# Patient Record
Sex: FEMALE | Race: ASIAN | NOT HISPANIC OR LATINO | Employment: FULL TIME | URBAN - METROPOLITAN AREA
[De-identification: names, ages, dates, MRNs, and addresses within clinical notes are randomized per-mention and may not be internally consistent; named-entity substitution may affect disease eponyms.]

---

## 2023-08-01 ENCOUNTER — OFFICE VISIT (OUTPATIENT)
Dept: FAMILY MEDICINE CLINIC | Facility: CLINIC | Age: 33
End: 2023-08-01
Payer: COMMERCIAL

## 2023-08-01 VITALS
SYSTOLIC BLOOD PRESSURE: 116 MMHG | HEIGHT: 62 IN | HEART RATE: 67 BPM | RESPIRATION RATE: 20 BRPM | WEIGHT: 118.8 LBS | OXYGEN SATURATION: 100 % | TEMPERATURE: 97.1 F | DIASTOLIC BLOOD PRESSURE: 82 MMHG | BODY MASS INDEX: 21.86 KG/M2

## 2023-08-01 DIAGNOSIS — F41.1 GAD (GENERALIZED ANXIETY DISORDER): Primary | ICD-10-CM

## 2023-08-01 DIAGNOSIS — M25.562 CHRONIC PAIN OF BOTH KNEES: ICD-10-CM

## 2023-08-01 DIAGNOSIS — Z86.59 HISTORY OF POSTTRAUMATIC STRESS DISORDER (PTSD): ICD-10-CM

## 2023-08-01 DIAGNOSIS — G89.29 CHRONIC PAIN OF BOTH KNEES: ICD-10-CM

## 2023-08-01 DIAGNOSIS — R09.82 PND (POST-NASAL DRIP): ICD-10-CM

## 2023-08-01 DIAGNOSIS — E78.2 MIXED HYPERLIPIDEMIA: ICD-10-CM

## 2023-08-01 DIAGNOSIS — Z11.59 ENCOUNTER FOR HEPATITIS C SCREENING TEST FOR LOW RISK PATIENT: ICD-10-CM

## 2023-08-01 DIAGNOSIS — R00.2 PALPITATIONS: ICD-10-CM

## 2023-08-01 DIAGNOSIS — K21.9 GASTROESOPHAGEAL REFLUX DISEASE, UNSPECIFIED WHETHER ESOPHAGITIS PRESENT: ICD-10-CM

## 2023-08-01 DIAGNOSIS — K58.1 IRRITABLE BOWEL SYNDROME WITH CONSTIPATION: ICD-10-CM

## 2023-08-01 DIAGNOSIS — Z12.4 SCREENING FOR CERVICAL CANCER: ICD-10-CM

## 2023-08-01 DIAGNOSIS — R14.0 ABDOMINAL BLOATING: ICD-10-CM

## 2023-08-01 DIAGNOSIS — M25.561 CHRONIC PAIN OF BOTH KNEES: ICD-10-CM

## 2023-08-01 DIAGNOSIS — Z11.4 ENCOUNTER FOR SCREENING FOR HIV: ICD-10-CM

## 2023-08-01 PROBLEM — K58.9 IRRITABLE BOWEL SYNDROME (IBS): Status: ACTIVE | Noted: 2023-08-01

## 2023-08-01 PROCEDURE — 99204 OFFICE O/P NEW MOD 45 MIN: CPT | Performed by: FAMILY MEDICINE

## 2023-08-01 RX ORDER — SERTRALINE HYDROCHLORIDE 25 MG/1
25 TABLET, FILM COATED ORAL DAILY
Qty: 30 TABLET | Refills: 5 | Status: SHIPPED | OUTPATIENT
Start: 2023-08-01 | End: 2024-01-28

## 2023-08-01 RX ORDER — FLUTICASONE PROPIONATE 50 MCG
1 SPRAY, SUSPENSION (ML) NASAL DAILY
Qty: 9.9 ML | Refills: 3 | Status: SHIPPED | OUTPATIENT
Start: 2023-08-01 | End: 2023-08-01

## 2023-08-01 RX ORDER — NORETHINDRONE ACETATE AND ETHINYL ESTRADIOL, ETHINYL ESTRADIOL AND FERROUS FUMARATE 1MG-10(24)
KIT ORAL
COMMUNITY
Start: 2023-04-01

## 2023-08-01 RX ORDER — FLUTICASONE PROPIONATE 50 MCG
1 SPRAY, SUSPENSION (ML) NASAL DAILY
Qty: 48 G | Refills: 0 | Status: SHIPPED | OUTPATIENT
Start: 2023-08-01

## 2023-08-01 NOTE — ASSESSMENT & PLAN NOTE
Will start on zoloft. Monitor closely and follow up in 1-2 months or sooner if needed. 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 She is also doing therapy which is great.

## 2023-08-01 NOTE — PROGRESS NOTES
Mik [facility] 1990 female MRN: [id number]    Family Medicine New Patient    ASSESSMENT/PLAN  Problem List Items Addressed This Visit        Digestive    Irritable bowel syndrome (IBS)     Patient had been worked up in the past with GI  Reports no concerning findings           Relevant Orders    Lipid panel    Comprehensive metabolic panel    CBC and differential    TSH, 3rd generation with Free T4 reflex    UA (URINE) with reflex to Scope    GERD (gastroesophageal reflux disease)    Relevant Orders    Lipid panel    Comprehensive metabolic panel    CBC and differential    TSH, 3rd generation with Free T4 reflex    UA (URINE) with reflex to Scope       Other    NAHID (generalized anxiety disorder) - Primary     Will start on zoloft. Monitor closely and follow up in 1-2 months or sooner if needed. 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 She is also doing therapy which is great.           Relevant Medications    sertraline (ZOLOFT) 25 mg tablet    Other Relevant Orders    Lipid panel    Comprehensive metabolic panel    CBC and differential    TSH, 3rd generation with Free T4 reflex    UA (URINE) with reflex to Scope    History of posttraumatic stress disorder (PTSD)    Palpitations     Discussed holter monitor if symptoms due not improve once anxiety is treated          Relevant Orders    Lipid panel    Comprehensive metabolic panel    CBC and differential    TSH, 3rd generation with Free T4 reflex    UA (URINE) with reflex to Scope    Abdominal bloating    Relevant Orders    Lipid panel    Comprehensive metabolic panel    CBC and differential    TSH, 3rd generation with Free T4 reflex    UA (URINE) with reflex to Scope    Chronic pain of both knees    Relevant Orders    Ambulatory Referral to Physical Therapy   Other Visit Diagnoses     Mixed hyperlipidemia        Relevant Orders    Lipid panel    Comprehensive metabolic panel    Encounter for screening for HIV        Relevant Orders    HIV 1/2 AG/AB W REFLEX [facility] and [facility] only    Encounter for hepatitis C screening test for low risk patient        Relevant Orders    Hepatitis C antibody    Screening for cervical cancer        Relevant Orders    Ambulatory referral to Obstetrics / Gynecology    PND (post-nasal drip)        Relevant Medications    fluticasone (FLONASE) 50 mcg/act nasal spray          Follow up in 2 months for CP/lab follow up/anxiety          No future appointments. SUBJECTIVE  CC: Establish care, Anxiety (And PTSD is interesting in Zoloft ), Palpitations, Knee Pain (Would like to see PT and possibly get imaging  ), and Allergies      HPI:  Ladon Boas is a 27 y.o. female who presents for establish care. 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 Moved to [place] to be with her significant other who is a local podiatrist.     HPI    Review of Systems   Constitutional: Negative for chills, fatigue and fever. HENT: Negative for congestion, postnasal drip, rhinorrhea and sinus pressure. Eyes: Negative for photophobia and visual disturbance. Respiratory: Negative for cough and shortness of breath. Cardiovascular: Positive for palpitations. Negative for chest pain and leg swelling. Gastrointestinal: Negative for abdominal pain, constipation, diarrhea, nausea and vomiting. Genitourinary: Negative for difficulty urinating and dysuria. Musculoskeletal: Negative for arthralgias and myalgias. Skin: Negative for color change and rash. Neurological: Negative for dizziness, weakness, light-headedness and headaches. Historical Information   The patient history was reviewed as follows:    Past Medical History:   Diagnosis Date   • Anxiety      History reviewed. No pertinent surgical history.   Family History   Problem Relation Age of Onset   • Mental illness Mother    • Schizophrenia Mother    • Hypertension Father    • Hyperlipidemia Father    • Diabetes Father • Heart disease Maternal Grandfather       Social History   Social History     Substance and Sexual Activity   Alcohol Use Yes   • Alcohol/week: 2.0 - 4.0 standard drinks of alcohol   • Types: 2 - 4 Glasses of wine per week    Comment: on weekend     Social History     Substance and Sexual Activity   Drug Use Never     Social History     Tobacco Use   Smoking Status Never   Smokeless Tobacco Never       Medications:     Current Outpatient Medications:   •  fluticasone (FLONASE) 50 mcg/act nasal spray, 1 spray into each nostril daily, Disp: 9.9 mL, Rfl: 3  •  Norethin-Eth Estrad-Fe Biphas (Lo Loestrin Fe) 1 MG-10 MCG / 10 MCG TABS, , Disp: , Rfl:   •  sertraline (ZOLOFT) 25 mg tablet, Take 1 tablet (25 mg total) by mouth daily, Disp: 30 tablet, Rfl: 5  No Known Allergies    OBJECTIVE    Vitals:   Vitals:    08/01/23 0731   BP: 116/82   BP Location: Left arm   Patient Position: Sitting   Cuff Size: Standard   Pulse: 67   Resp: 20   Temp: (!) 97.1 °F (36.2 °C)   TempSrc: Tympanic   SpO2: 100%   Weight: 53.9 kg (118 lb 12.8 oz)   Height: 5' 2" (1.575 m)           Physical Exam  Constitutional:       Appearance: She is well-developed. HENT:      Head: Normocephalic and atraumatic. Eyes:      Pupils: Pupils are equal, round, and reactive to light. Cardiovascular:      Rate and Rhythm: Normal rate and regular rhythm. Heart sounds: Normal heart sounds. Pulmonary:      Effort: Pulmonary effort is normal. No respiratory distress. Breath sounds: Normal breath sounds. No wheezing. Abdominal:      General: Bowel sounds are normal. There is no distension. Palpations: Abdomen is soft. Tenderness: There is no abdominal tenderness. Musculoskeletal:         General: No tenderness. Normal range of motion. Cervical back: Normal range of motion and neck supple. Skin:     General: Skin is warm and dry. Neurological:      Mental Status: She is alert and oriented to person, place, and time. Psychiatric:         Behavior: Behavior normal.            Labs:        DO Sanjuana    8/1/2023

## 2023-10-05 LAB
ALBUMIN SERPL-MCNC: 4.5 G/DL (ref 3.9–4.9)
ALBUMIN/GLOB SERPL: 2.5 {RATIO} (ref 1.2–2.2)
ALP SERPL-CCNC: 33 IU/L (ref 44–121)
ALT SERPL-CCNC: 11 IU/L (ref 0–32)
APPEARANCE UR: CLEAR
AST SERPL-CCNC: 14 IU/L (ref 0–40)
BACTERIA URNS QL MICRO: NORMAL
BASOPHILS # BLD AUTO: 0.1 X10E3/UL (ref 0–0.2)
BASOPHILS NFR BLD AUTO: 1 %
BILIRUB SERPL-MCNC: 0.2 MG/DL (ref 0–1.2)
BILIRUB UR QL STRIP: NEGATIVE
BUN SERPL-MCNC: 21 MG/DL (ref 6–20)
BUN/CREAT SERPL: 28 (ref 9–23)
CALCIUM SERPL-MCNC: 9.3 MG/DL (ref 8.7–10.2)
CASTS URNS QL MICRO: NORMAL /LPF
CHLORIDE SERPL-SCNC: 100 MMOL/L (ref 96–106)
CHOLEST SERPL-MCNC: 219 MG/DL (ref 100–199)
CHOLEST/HDLC SERPL: 3.4 RATIO (ref 0–4.4)
CO2 SERPL-SCNC: 22 MMOL/L (ref 20–29)
COLOR UR: YELLOW
CREAT SERPL-MCNC: 0.74 MG/DL (ref 0.57–1)
EGFR: 109 ML/MIN/1.73
EOSINOPHIL # BLD AUTO: 0.2 X10E3/UL (ref 0–0.4)
EOSINOPHIL NFR BLD AUTO: 3 %
EPI CELLS #/AREA URNS HPF: NORMAL /HPF (ref 0–10)
ERYTHROCYTE [DISTWIDTH] IN BLOOD BY AUTOMATED COUNT: 11.1 % (ref 11.7–15.4)
GLOBULIN SER-MCNC: 1.8 G/DL (ref 1.5–4.5)
GLUCOSE SERPL-MCNC: 93 MG/DL (ref 70–99)
GLUCOSE UR QL: NEGATIVE
HCT VFR BLD AUTO: 37.4 % (ref 34–46.6)
HCV AB S/CO SERPL IA: NON REACTIVE
HDLC SERPL-MCNC: 64 MG/DL
HGB BLD-MCNC: 12.4 G/DL (ref 11.1–15.9)
HGB UR QL STRIP: ABNORMAL
HIV 1+2 AB+HIV1 P24 AG SERPL QL IA: NON REACTIVE
IMM GRANULOCYTES # BLD: 0 X10E3/UL (ref 0–0.1)
IMM GRANULOCYTES NFR BLD: 0 %
KETONES UR QL STRIP: NEGATIVE
LDLC SERPL CALC-MCNC: 137 MG/DL (ref 0–99)
LEUKOCYTE ESTERASE UR QL STRIP: NEGATIVE
LYMPHOCYTES # BLD AUTO: 2.5 X10E3/UL (ref 0.7–3.1)
LYMPHOCYTES NFR BLD AUTO: 43 %
MCH RBC QN AUTO: 30.5 PG (ref 26.6–33)
MCHC RBC AUTO-ENTMCNC: 33.2 G/DL (ref 31.5–35.7)
MCV RBC AUTO: 92 FL (ref 79–97)
MICRO URNS: ABNORMAL
MONOCYTES # BLD AUTO: 0.3 X10E3/UL (ref 0.1–0.9)
MONOCYTES NFR BLD AUTO: 5 %
NEUTROPHILS # BLD AUTO: 2.8 X10E3/UL (ref 1.4–7)
NEUTROPHILS NFR BLD AUTO: 48 %
NITRITE UR QL STRIP: NEGATIVE
PH UR STRIP: 6 [PH] (ref 5–7.5)
PLATELET # BLD AUTO: 280 X10E3/UL (ref 150–450)
POTASSIUM SERPL-SCNC: 4.2 MMOL/L (ref 3.5–5.2)
PROT SERPL-MCNC: 6.3 G/DL (ref 6–8.5)
PROT UR QL STRIP: NEGATIVE
RBC # BLD AUTO: 4.07 X10E6/UL (ref 3.77–5.28)
RBC #/AREA URNS HPF: NORMAL /HPF (ref 0–2)
SL AMB VLDL CHOLESTEROL CALC: 18 MG/DL (ref 5–40)
SODIUM SERPL-SCNC: 137 MMOL/L (ref 134–144)
SP GR UR: 1.02 (ref 1–1.03)
TRIGL SERPL-MCNC: 104 MG/DL (ref 0–149)
TSH SERPL DL<=0.005 MIU/L-ACNC: 3.29 UIU/ML (ref 0.45–4.5)
UROBILINOGEN UR STRIP-ACNC: 0.2 MG/DL (ref 0.2–1)
WBC # BLD AUTO: 5.8 X10E3/UL (ref 3.4–10.8)
WBC #/AREA URNS HPF: NORMAL /HPF (ref 0–5)

## 2023-10-14 NOTE — PROGRESS NOTES
Assessment/Plan:    Encounter for gynecological examination (general) (routine) without abnormal findings  28 yo G0, new patient here for well check. Happy on OCPs, takes continuously with rare, light bleeding. Same partner, does not use condoms. Had egg retrieval and freezing earlier this year. HPV vaccine completed as teen. Normal breast and pelvic exams. Pap and cultures done. OCPs refilled. RTO one year       Diagnoses and all orders for this visit:    Encounter for gynecological examination (general) (routine) without abnormal findings    Screening for cervical cancer  -     Ambulatory referral to Obstetrics / Gynecology    Surveillance for birth control, oral contraceptives  -     Norethin-Eth Estrad-Fe Biphas (Lo Loestrin Fe) 1 MG-10 MCG / 10 MCG TABS; Take 1 tablet by mouth in the morning , take continuously    Screening for malignant neoplasm of the cervix  -     IGP,CtNg,AptimaHPV,rfx16/18,45    Other orders  -     omeprazole (PriLOSEC) 10 mg delayed release capsule; Take 10 mg by mouth daily          Subjective:      Patient ID: Magdalena Manuel is a 27 y.o. female. HPI NP here for well check      The following portions of the patient's history were reviewed and updated as appropriate: She  has a past medical history of Anxiety, Gardasil complete, and Gastritis. She   Patient Active Problem List    Diagnosis Date Noted    Encounter for gynecological examination (general) (routine) without abnormal findings 10/17/2023    NAHID (generalized anxiety disorder) 08/01/2023    History of posttraumatic stress disorder (PTSD) 08/01/2023    Palpitations 08/01/2023    Irritable bowel syndrome (IBS) 08/01/2023    Chronic pain of both knees 08/01/2023    Abdominal bloating 02/13/2017    GERD (gastroesophageal reflux disease) 03/04/2013     She  has a past surgical history that includes [place] tooth extraction (N/A, 2008); AUGMENTATION BREAST (Bilateral, 2018); and LAPAROSCOPY (2023).   Her family history includes Diabetes in her father; Heart disease in her maternal grandfather; Hyperlipidemia in her father; Hypertension in her father; Mental illness in her mother; Schizophrenia in her mother. She  reports that she has never smoked. She has never used smokeless tobacco. She reports current alcohol use of about 2.0 - 4.0 standard drinks of alcohol per week. She reports that she does not use drugs. Current Outpatient Medications   Medication Sig Dispense Refill    fluticasone (FLONASE) 50 mcg/act nasal spray SHAKE LIQUID AND USE 1 SPRAY IN EACH NOSTRIL DAILY 48 g 0    Norethin-Eth Estrad-Fe Biphas (Lo Loestrin Fe) 1 MG-10 MCG / 10 MCG TABS Take 1 tablet by mouth in the morning , take continuously 112 tablet 3    omeprazole (PriLOSEC) 10 mg delayed release capsule Take 10 mg by mouth daily       No current facility-administered medications for this visit. She has No Known Allergies. .    Review of Systems  No breast, bladder, bowel changes.  No new persistent pain, bloating, early satiety or pelvic pressure      Objective:      BP 90/62   Ht 5' 1.25" (1.556 m)   Wt 54.3 kg (119 lb 9.6 oz)   LMP 07/22/2023 Comment: pt. takes active OC's continuously  BMI 22.41 kg/m²          Physical Exam    General appearance: no distress, pleasant  Neck: thyroid without nodules or thyromegaly, no palpable adenopathy  Lymph nodes: no palpable adenopathy  Breasts: s/p mammopexy, well healed scars, no masses, nodes or skin changes  Abdomen: soft, non tender, no palpable masses  Pelvic exam: normal external genitalia, urethral meatus normal, vagina without lesions, cervix without lesions, uterus small, non tender, no adnexal masses, non tender  Rectal exam: deferred

## 2023-10-17 ENCOUNTER — OFFICE VISIT (OUTPATIENT)
Dept: OBGYN CLINIC | Facility: CLINIC | Age: 33
End: 2023-10-17
Payer: COMMERCIAL

## 2023-10-17 ENCOUNTER — TELEPHONE (OUTPATIENT)
Dept: OBGYN CLINIC | Facility: CLINIC | Age: 33
End: 2023-10-17

## 2023-10-17 VITALS
BODY MASS INDEX: 22.58 KG/M2 | DIASTOLIC BLOOD PRESSURE: 62 MMHG | HEIGHT: 61 IN | SYSTOLIC BLOOD PRESSURE: 90 MMHG | WEIGHT: 119.6 LBS

## 2023-10-17 DIAGNOSIS — Z12.4 SCREENING FOR MALIGNANT NEOPLASM OF THE CERVIX: ICD-10-CM

## 2023-10-17 DIAGNOSIS — Z12.4 SCREENING FOR CERVICAL CANCER: ICD-10-CM

## 2023-10-17 DIAGNOSIS — Z30.41 SURVEILLANCE FOR BIRTH CONTROL, ORAL CONTRACEPTIVES: ICD-10-CM

## 2023-10-17 DIAGNOSIS — Z01.419 ENCOUNTER FOR GYNECOLOGICAL EXAMINATION (GENERAL) (ROUTINE) WITHOUT ABNORMAL FINDINGS: Primary | ICD-10-CM

## 2023-10-17 PROCEDURE — S0610 ANNUAL GYNECOLOGICAL EXAMINA: HCPCS | Performed by: OBSTETRICS & GYNECOLOGY

## 2023-10-17 RX ORDER — NORETHINDRONE ACETATE AND ETHINYL ESTRADIOL, ETHINYL ESTRADIOL AND FERROUS FUMARATE 1MG-10(24)
1 KIT ORAL DAILY
Qty: 112 TABLET | Refills: 3 | Status: SHIPPED | OUTPATIENT
Start: 2023-10-17

## 2023-10-17 RX ORDER — OMEPRAZOLE 10 MG/1
10 CAPSULE, DELAYED RELEASE ORAL DAILY
COMMUNITY

## 2023-10-17 NOTE — TELEPHONE ENCOUNTER
[facility] [employer] contacted office re: Laila Newman is not covered by her insurance. Madalyn or Madalyn 24 are covered. Message sent to Dr. Sybil Jhaveri.

## 2023-10-17 NOTE — PATIENT INSTRUCTIONS
Return to office in one year unless having any problems. Call in six months to schedule your annual visit.

## 2023-10-17 NOTE — ASSESSMENT & PLAN NOTE
28 yo G0, new patient here for well check. Happy on OCPs, takes continuously with rare, light bleeding. Same partner, does not use condoms. Had egg retrieval and freezing earlier this year. HPV vaccine completed as teen. Normal breast and pelvic exams. Pap and cultures done. OCPs refilled.    RTO one year

## 2023-10-17 NOTE — LETTER
October 17, 2023     Nicki Velasco, [facility]  [address] [facility]    Patient: Janel Ridley   YOB: 1990   Date of Visit: 10/17/2023       Dear Dr. Thiago Smith: Thank you for referring Janel Ridley to me for evaluation. Below are my notes for this consultation. If you have questions, please do not hesitate to call me. I look forward to following your patient along with you. Sincerely,        Evan Evans MD        CC: No Recipients    Evan Evans MD  10/17/2023  9:43 AM  Sign when Signing Visit  Assessment/Plan:    Encounter for gynecological examination (general) (routine) without abnormal findings  28 yo G0, new patient here for well check. Happy on OCPs, takes continuously with rare, light bleeding. Same partner, does not use condoms. Had egg retrieval and freezing earlier this year. HPV vaccine completed as teen. Normal breast and pelvic exams. Pap and cultures done. OCPs refilled. RTO one year       Diagnoses and all orders for this visit:    Encounter for gynecological examination (general) (routine) without abnormal findings    Screening for cervical cancer  -     Ambulatory referral to Obstetrics / Gynecology    Surveillance for birth control, oral contraceptives  -     Norethin-Eth Estrad-Fe Biphas (Lo Loestrin Fe) 1 MG-10 MCG / 10 MCG TABS; Take 1 tablet by mouth in the morning , take continuously    Screening for malignant neoplasm of the cervix  -     IGP,CtNg,AptimaHPV,rfx16/18,45    Other orders  -     omeprazole (PriLOSEC) 10 mg delayed release capsule; Take 10 mg by mouth daily          Subjective:      Patient ID: Janel Ridley is a 27 y.o. female. HPI NP here for well check      The following portions of the patient's history were reviewed and updated as appropriate: She  has a past medical history of Anxiety, Gardasil complete, and Gastritis.   She   Patient Active Problem List    Diagnosis Date Noted   • Encounter for gynecological examination (general) (routine) without abnormal findings 10/17/2023   • NAHID (generalized anxiety disorder) 08/01/2023   • History of posttraumatic stress disorder (PTSD) 08/01/2023   • Palpitations 08/01/2023   • Irritable bowel syndrome (IBS) 08/01/2023   • Chronic pain of both knees 08/01/2023   • Abdominal bloating 02/13/2017   • GERD (gastroesophageal reflux disease) 03/04/2013     She  has a past surgical history that includes [place] tooth extraction (N/A, 2008); AUGMENTATION BREAST (Bilateral, 2018); and LAPAROSCOPY (2023). Her family history includes Diabetes in her father; Heart disease in her maternal grandfather; Hyperlipidemia in her father; Hypertension in her father; Mental illness in her mother; Schizophrenia in her mother. She  reports that she has never smoked. She has never used smokeless tobacco. She reports current alcohol use of about 2.0 - 4.0 standard drinks of alcohol per week. She reports that she does not use drugs. Current Outpatient Medications   Medication Sig Dispense Refill   • fluticasone (FLONASE) 50 mcg/act nasal spray SHAKE LIQUID AND USE 1 SPRAY IN EACH NOSTRIL DAILY 48 g 0   • Norethin-Eth Estrad-Fe Biphas (Lo Loestrin Fe) 1 MG-10 MCG / 10 MCG TABS Take 1 tablet by mouth in the morning , take continuously 112 tablet 3   • omeprazole (PriLOSEC) 10 mg delayed release capsule Take 10 mg by mouth daily       No current facility-administered medications for this visit. She has No Known Allergies. .    Review of Systems  No breast, bladder, bowel changes.  No new persistent pain, bloating, early satiety or pelvic pressure      Objective:      BP 90/62   Ht 5' 1.25" (1.556 m)   Wt 54.3 kg (119 lb 9.6 oz)   LMP 07/22/2023 Comment: pt. takes active OC's continuously  BMI 22.41 kg/m²          Physical Exam    General appearance: no distress, pleasant  Neck: thyroid without nodules or thyromegaly, no palpable adenopathy  Lymph nodes: no palpable adenopathy  Breasts: s/p mammopexy, well healed scars, no masses, nodes or skin changes  Abdomen: soft, non tender, no palpable masses  Pelvic exam: normal external genitalia, urethral meatus normal, vagina without lesions, cervix without lesions, uterus small, non tender, no adnexal masses, non tender  Rectal exam: deferred

## 2023-10-19 NOTE — TELEPHONE ENCOUNTER
Please contact Radha and ask if she would like to switch from her Lo loestrin to a different, affordable option. The difference would be that Lo Loestrin Fe contains 10 mcg ethinyl estradiol per active pill, while Loestrin Fe (and generic equivalents) contains 20 mcg ethinyl estradiol per active pill.    Thanks

## 2023-10-21 LAB
C TRACH RRNA CVX QL NAA+PROBE: NEGATIVE
CYTOLOGIST CVX/VAG CYTO: NORMAL
DX ICD CODE: NORMAL
HPV GENOTYPE REFLEX: NORMAL
HPV I/H RISK 4 DNA CVX QL PROBE+SIG AMP: NEGATIVE
N GONORRHOEA RRNA CVX QL NAA+PROBE: NEGATIVE
OTHER STN SPEC: NORMAL
PATH REPORT.FINAL DX SPEC: NORMAL
SL AMB NOTE:: NORMAL
SL AMB SPECIMEN ADEQUACY: NORMAL
SL AMB TEST METHODOLOGY: NORMAL

## 2023-11-03 ENCOUNTER — OFFICE VISIT (OUTPATIENT)
Dept: FAMILY MEDICINE CLINIC | Facility: CLINIC | Age: 33
End: 2023-11-03
Payer: COMMERCIAL

## 2023-11-03 VITALS
RESPIRATION RATE: 16 BRPM | TEMPERATURE: 97.6 F | HEART RATE: 78 BPM | OXYGEN SATURATION: 100 % | DIASTOLIC BLOOD PRESSURE: 76 MMHG | WEIGHT: 120 LBS | HEIGHT: 61 IN | SYSTOLIC BLOOD PRESSURE: 108 MMHG | BODY MASS INDEX: 22.66 KG/M2

## 2023-11-03 DIAGNOSIS — Z82.49 FAMILY HISTORY OF PREMATURE CAD: ICD-10-CM

## 2023-11-03 DIAGNOSIS — M25.511 CHRONIC RIGHT SHOULDER PAIN: ICD-10-CM

## 2023-11-03 DIAGNOSIS — F41.1 GAD (GENERALIZED ANXIETY DISORDER): ICD-10-CM

## 2023-11-03 DIAGNOSIS — E78.5 HYPERLIPIDEMIA, UNSPECIFIED HYPERLIPIDEMIA TYPE: ICD-10-CM

## 2023-11-03 DIAGNOSIS — Z00.00 ANNUAL PHYSICAL EXAM: Primary | ICD-10-CM

## 2023-11-03 DIAGNOSIS — G89.29 CHRONIC RIGHT SHOULDER PAIN: ICD-10-CM

## 2023-11-03 PROCEDURE — 99395 PREV VISIT EST AGE 18-39: CPT | Performed by: FAMILY MEDICINE

## 2023-11-03 NOTE — PROGRESS NOTES
[address] [facility]    NAME: Radha Knowles  AGE: 27 y.o. SEX: female  : 1990     DATE: 11/3/2023     Assessment and Plan:     Problem List Items Addressed This Visit          Other    NAHID (generalized anxiety disorder)     Failed treatment with prozac and zoloft due to side effects. She is feeling ok off medication at this time. Did ok on buspar  in the past. We can consider this in the future. 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 Other Visit Diagnoses       Annual physical exam    -  Primary    Chronic right shoulder pain        Relevant Orders    Ambulatory Referral to Physical Therapy    Hyperlipidemia, unspecified hyperlipidemia type        Relevant Orders    Ambulatory Referral to Cardiology    Family history of premature CAD        Relevant Orders    Ambulatory Referral to Cardiology            Immunizations and preventive care screenings were discussed with patient today. Appropriate education was printed on patient's after visit summary. Counseling:  Alcohol/drug use: discussed moderation in alcohol intake, the recommendations for healthy alcohol use, and avoidance of illicit drug use. Dental Health: discussed importance of regular tooth brushing, flossing, and dental visits. Injury prevention: discussed safety/seat belts, safety helmets, smoke detectors, carbon dioxide detectors, and smoking near bedding or upholstery. Sexual health: discussed sexually transmitted diseases, partner selection, use of condoms, avoidance of unintended pregnancy, and contraceptive alternatives. Exercise: the importance of regular exercise/physical activity was discussed. Recommend exercise 3-5 times per week for at least 30 minutes.           Return in about 1 year (around 11/3/2024) for Annual physical.     Chief Complaint:     Chief Complaint   Patient presents with Annual Exam      History of Present Illness:     Adult Annual Physical   Patient here for a comprehensive physical exam. The patient reports no problems. Diet and Physical Activity  Diet/Nutrition: well balanced diet. Exercise: vigorous cardiovascular exercise. Depression Screening  PHQ-2/9 Depression Screening             /GYN Health  Follows with gyn        Review of Systems:     Review of Systems   Constitutional:  Negative for chills, fatigue and fever. HENT:  Negative for congestion, postnasal drip, rhinorrhea and sinus pressure. Eyes:  Negative for photophobia and visual disturbance. Respiratory:  Negative for cough and shortness of breath. Cardiovascular:  Negative for chest pain, palpitations and leg swelling. Gastrointestinal:  Negative for abdominal pain, constipation, diarrhea, nausea and vomiting. Genitourinary:  Negative for difficulty urinating and dysuria. Musculoskeletal:  Negative for arthralgias and myalgias. Skin:  Negative for color change and rash. Neurological:  Negative for dizziness, weakness, light-headedness and headaches. Past Medical History:     Past Medical History:   Diagnosis Date    Anxiety     Gardasil complete     per pt as teen    Gastritis       Past Surgical History:     Past Surgical History:   Procedure Laterality Date    AUGMENTATION BREAST Bilateral 2018    breast lift    LAPAROSCOPY  2023    For egg freezing    WISDOM TOOTH EXTRACTION N/A 2008      Social History:     Social History     Socioeconomic History    Marital status: Single     Spouse name: None    Number of children: None    Years of education: None    Highest education level: None   Occupational History    None   Tobacco Use    Smoking status: Never    Smokeless tobacco: Never   Vaping Use    Vaping Use: Never used   Substance and Sexual Activity    Alcohol use:  Yes     Alcohol/week: 2.0 - 4.0 standard drinks of alcohol     Types: 2 - 4 Glasses of wine per week     Comment: on weekend    Drug use: Never    Sexual activity: Yes     Partners: Male     Birth control/protection: Pill   Other Topics Concern    None   Social History Narrative    None     Social Determinants of Health     Financial Resource Strain: Not on file   Food Insecurity: Not on file   Transportation Needs: Not on file   Physical Activity: Not on file   Stress: Not on file   Social Connections: Not on file   Intimate Partner Violence: Not on file   Housing Stability: Not on file      Family History:     Family History   Problem Relation Age of Onset    Mental illness Mother     Schizophrenia Mother     Hypertension Father     Hyperlipidemia Father     Diabetes Father     Heart disease Maternal Grandfather     Breast cancer Neg Hx     Uterine cancer Neg Hx     Ovarian cancer Neg Hx     Colon cancer Neg Hx     Thrombosis Neg Hx       Current Medications:     Current Outpatient Medications   Medication Sig Dispense Refill    fluticasone (FLONASE) 50 mcg/act nasal spray SHAKE LIQUID AND USE 1 SPRAY IN EACH NOSTRIL DAILY 48 g 0    Norethin-Eth Estrad-Fe Biphas (Lo Loestrin Fe) 1 MG-10 MCG / 10 MCG TABS Take 1 tablet by mouth in the morning , take continuously 112 tablet 3     No current facility-administered medications for this visit. Allergies:     No Known Allergies   Physical Exam:     /76 (BP Location: Left arm, Patient Position: Sitting, Cuff Size: Adult)   Pulse 78   Temp 97.6 °F (36.4 °C) (Tympanic)   Resp 16   Ht 5' 1.25" (1.556 m)   Wt 54.4 kg (120 lb)   LMP 10/22/2023 (Exact Date) Comment: pt. takes active OC's continuously  SpO2 100%   BMI 22.49 kg/m²     Physical Exam  Constitutional:       General: She is not in acute distress. Appearance: Normal appearance. She is not ill-appearing, toxic-appearing or diaphoretic. HENT:      Head: Normocephalic and atraumatic.       Right Ear: Tympanic membrane and ear canal normal.      Left Ear: Tympanic membrane and ear canal normal.      Nose: Nose normal. No congestion. Mouth/Throat:      Mouth: Mucous membranes are moist.      Pharynx: Oropharynx is clear. No oropharyngeal exudate. Eyes:      Extraocular Movements: Extraocular movements intact. Conjunctiva/sclera: Conjunctivae normal.      Pupils: Pupils are equal, round, and reactive to light. Cardiovascular:      Rate and Rhythm: Normal rate and regular rhythm. Pulses: Normal pulses. Heart sounds: No murmur heard. Pulmonary:      Effort: Pulmonary effort is normal.      Breath sounds: Normal breath sounds. No wheezing, rhonchi or rales. Abdominal:      General: Bowel sounds are normal. There is no distension. Palpations: Abdomen is soft. Tenderness: There is no abdominal tenderness. Musculoskeletal:         General: No swelling or tenderness. Normal range of motion. Cervical back: Normal range of motion and neck supple. Skin:     General: Skin is warm and dry. Capillary Refill: Capillary refill takes less than 2 seconds. Neurological:      General: No focal deficit present. Mental Status: She is alert and oriented to person, place, and time. Cranial Nerves: No cranial nerve deficit. Psychiatric:         Mood and Affect: Mood normal.         Behavior: Behavior normal.         Thought Content:  Thought content normal.          Santos Rawls DO   [address] [facility]

## 2023-11-03 NOTE — ASSESSMENT & PLAN NOTE
Failed treatment with prozac and zoloft due to side effects. She is feeling ok off medication at this time. Did ok on buspar  in the past. We can consider this in the future. 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

## 2023-11-03 NOTE — PATIENT INSTRUCTIONS
Wellness Visit for Adults   AMBULATORY CARE:   A wellness visit  is when you see your healthcare provider to get screened for health problems. Your healthcare provider will also give you advice on how to stay healthy. Write down your questions so you remember to ask them. Ask your healthcare provider how often you should have a wellness visit. What happens at a wellness visit:  Your healthcare provider will ask about your health, and your family history of health problems. This includes high blood pressure, heart disease, and cancer. He or she will ask if you have symptoms that concern you, if you smoke, and about your mood. You may also be asked about your intake of medicines, supplements, food, and alcohol. Any of the following may be done: Your weight  will be checked. Your height may also be checked so your body mass index (BMI) can be calculated. Your BMI shows if you are at a healthy weight. Your blood pressure  and heart rate will be checked. Your temperature may also be checked. Blood and urine tests  may be done. Blood tests may be done to check your cholesterol levels. Abnormal cholesterol levels increase your risk for heart disease and stroke. You may also need a blood or urine test to check for diabetes if you are at increased risk. Urine tests may be done to look for signs of an infection or kidney disease. A physical exam  includes checking your heartbeat and lungs with a stethoscope. Your healthcare provider may also check your skin to look for sun damage. Screening tests  may be recommended. A screening test is done to check for diseases that may not cause symptoms. The screening tests you may need depend on your age, gender, family history, and lifestyle habits. For example, colorectal screening may be recommended if you are 48years old or older. Screening tests you need if you are a woman:   A Pap smear  is used to screen for cervical cancer.  Pap smears are usually done every 3 to 5 years depending on your age. You may need them more often if you have had abnormal Pap smear test results in the past. Ask your healthcare provider how often you should have a Pap smear. A mammogram  is an x-ray of your breasts to screen for breast cancer. Experts recommend mammograms every 2 years starting at age 48 years. You may need a mammogram at age 52 years or younger if you have an increased risk for breast cancer. Talk to your healthcare provider about when you should start having mammograms and how often you need them. Vaccines you may need:   Get an influenza vaccine  every year. The influenza vaccine protects you from the flu. Several types of viruses cause the flu. The viruses change over time, so new vaccines are made each year. Get a tetanus-diphtheria (Td) booster vaccine  every 10 years. This vaccine protects you against tetanus and diphtheria. Tetanus is a severe infection that may cause painful muscle spasms and lockjaw. Diphtheria is a severe bacterial infection that causes a thick covering in the back of your mouth and throat. Get a human papillomavirus (HPV) vaccine  if you are female and aged 23 to 32 or male 23 to 24 and never received it. This vaccine protects you from HPV infection. HPV is the most common infection spread by sexual contact. HPV may also cause vaginal, penile, and anal cancers. Get a pneumococcal vaccine  if you are aged 72 years or older. The pneumococcal vaccine is an injection given to protect you from pneumococcal disease. Pneumococcal disease is an infection caused by pneumococcal bacteria. The infection may cause pneumonia, meningitis, or an ear infection. Get a shingles vaccine  if you are 60 or older, even if you have had shingles before. The shingles vaccine is an injection to protect you from the varicella-zoster virus. This is the same virus that causes chickenpox.  Shingles is a painful rash that develops in people who had chickenpox or have been exposed to the virus. How to eat healthy:  My Plate is a model for planning healthy meals. It shows the types and amounts of foods that should go on your plate. Fruits and vegetables make up about half of your plate, and grains and protein make up the other half. A serving of dairy is included on the side of your plate. The amount of calories and serving sizes you need depends on your age, gender, weight, and height. Examples of healthy foods are listed below:  Eat a variety of vegetables  such as dark green, red, and orange vegetables. You can also include canned vegetables low in sodium (salt) and frozen vegetables without added butter or sauces. Eat a variety of fresh fruits , canned fruit in 100% juice, frozen fruit, and dried fruit. Include whole grains. At least half of the grains you eat should be whole grains. Examples include whole-wheat bread, wheat pasta, brown rice, and whole-grain cereals such as oatmeal.    Eat a variety of protein foods such as seafood (fish and shellfish), lean meat, and poultry without skin (turkey and chicken). Examples of lean meats include pork leg, shoulder, or tenderloin, and beef round, sirloin, tenderloin, and extra lean ground beef. Other protein foods include eggs and egg substitutes, beans, peas, soy products, nuts, and seeds. Choose low-fat dairy products such as skim or 1% milk or low-fat yogurt, cheese, and cottage cheese. Limit unhealthy fats  such as butter, hard margarine, and shortening. Exercise:  Exercise at least 30 minutes per day on most days of the week. Some examples of exercise include walking, biking, dancing, and swimming. You can also fit in more physical activity by taking the stairs instead of the elevator or parking farther away from stores. Include muscle strengthening activities 2 days each week. Regular exercise provides many health benefits.  It helps you manage your weight, and decreases your risk for type 2 diabetes, heart disease, stroke, and high blood pressure. Exercise can also help improve your mood. Ask your healthcare provider about the best exercise plan for you. General health and safety guidelines:   Do not smoke. Nicotine and other chemicals in cigarettes and cigars can cause lung damage. Ask your healthcare provider for information if you currently smoke and need help to quit. E-cigarettes or smokeless tobacco still contain nicotine. Talk to your healthcare provider before you use these products. Limit alcohol. A drink of alcohol is 12 ounces of beer, 5 ounces of wine, or 1½ ounces of liquor. Lose weight, if needed. Being overweight increases your risk of certain health conditions. These include heart disease, high blood pressure, type 2 diabetes, and certain types of cancer. Protect your skin. Do not sunbathe or use tanning beds. Use sunscreen with a SPF 15 or higher. Apply sunscreen at least 15 minutes before you go outside. Reapply sunscreen every 2 hours. Wear protective clothing, hats, and sunglasses when you are outside. Drive safely. Always wear your seatbelt. Make sure everyone in your car wears a seatbelt. A seatbelt can save your life if you are in an accident. Do not use your cell phone when you are driving. This could distract you and cause an accident. Pull over if you need to make a call or send a text message. Practice safe sex. Use latex condoms if are sexually active and have more than one partner. Your healthcare provider may recommend screening tests for sexually transmitted infections (STIs). Wear helmets, lifejackets, and protective gear. Always wear a helmet when you ride a bike or motorcycle, go skiing, or play sports that could cause a head injury. Wear protective equipment when you play sports. Wear a lifejacket when you are on a boat or doing water sports.     © Copyright [facility] 2023 Information is for End User's use only and may not be sold, redistributed or otherwise used for commercial purposes. The above information is an  only. It is not intended as medical advice for individual conditions or treatments. Talk to your doctor, nurse or pharmacist before following any medical regimen to see if it is safe and effective for you. Wellness Visit for Adults   AMBULATORY CARE:   A wellness visit  is when you see your healthcare provider to get screened for health problems. Your healthcare provider will also give you advice on how to stay healthy. Write down your questions so you remember to ask them. Ask your healthcare provider how often you should have a wellness visit. What happens at a wellness visit:  Your healthcare provider will ask about your health, and your family history of health problems. This includes high blood pressure, heart disease, and cancer. He or she will ask if you have symptoms that concern you, if you smoke, and about your mood. You may also be asked about your intake of medicines, supplements, food, and alcohol. Any of the following may be done: Your weight  will be checked. Your height may also be checked so your body mass index (BMI) can be calculated. Your BMI shows if you are at a healthy weight. Your blood pressure  and heart rate will be checked. Your temperature may also be checked. Blood and urine tests  may be done. Blood tests may be done to check your cholesterol levels. Abnormal cholesterol levels increase your risk for heart disease and stroke. You may also need a blood or urine test to check for diabetes if you are at increased risk. Urine tests may be done to look for signs of an infection or kidney disease. A physical exam  includes checking your heartbeat and lungs with a stethoscope. Your healthcare provider may also check your skin to look for sun damage. Screening tests  may be recommended. A screening test is done to check for diseases that may not cause symptoms.  The screening tests you may need depend on your age, gender, family history, and lifestyle habits. For example, colorectal screening may be recommended if you are 48years old or older. Screening tests you need if you are a woman:   A Pap smear  is used to screen for cervical cancer. Pap smears are usually done every 3 to 5 years depending on your age. You may need them more often if you have had abnormal Pap smear test results in the past. Ask your healthcare provider how often you should have a Pap smear. A mammogram  is an x-ray of your breasts to screen for breast cancer. Experts recommend mammograms every 2 years starting at age 48 years. You may need a mammogram at age 52 years or younger if you have an increased risk for breast cancer. Talk to your healthcare provider about when you should start having mammograms and how often you need them. Vaccines you may need:   Get an influenza vaccine  every year. The influenza vaccine protects you from the flu. Several types of viruses cause the flu. The viruses change over time, so new vaccines are made each year. Get a tetanus-diphtheria (Td) booster vaccine  every 10 years. This vaccine protects you against tetanus and diphtheria. Tetanus is a severe infection that may cause painful muscle spasms and lockjaw. Diphtheria is a severe bacterial infection that causes a thick covering in the back of your mouth and throat. Get a human papillomavirus (HPV) vaccine  if you are female and aged 23 to 32 or male 23 to 24 and never received it. This vaccine protects you from HPV infection. HPV is the most common infection spread by sexual contact. HPV may also cause vaginal, penile, and anal cancers. Get a pneumococcal vaccine  if you are aged 72 years or older. The pneumococcal vaccine is an injection given to protect you from pneumococcal disease. Pneumococcal disease is an infection caused by pneumococcal bacteria. The infection may cause pneumonia, meningitis, or an ear infection.     Get a shingles vaccine  if you are 60 or older, even if you have had shingles before. The shingles vaccine is an injection to protect you from the varicella-zoster virus. This is the same virus that causes chickenpox. Shingles is a painful rash that develops in people who had chickenpox or have been exposed to the virus. How to eat healthy:  My Plate is a model for planning healthy meals. It shows the types and amounts of foods that should go on your plate. Fruits and vegetables make up about half of your plate, and grains and protein make up the other half. A serving of dairy is included on the side of your plate. The amount of calories and serving sizes you need depends on your age, gender, weight, and height. Examples of healthy foods are listed below:  Eat a variety of vegetables  such as dark green, red, and orange vegetables. You can also include canned vegetables low in sodium (salt) and frozen vegetables without added butter or sauces. Eat a variety of fresh fruits , canned fruit in 100% juice, frozen fruit, and dried fruit. Include whole grains. At least half of the grains you eat should be whole grains. Examples include whole-wheat bread, wheat pasta, brown rice, and whole-grain cereals such as oatmeal.    Eat a variety of protein foods such as seafood (fish and shellfish), lean meat, and poultry without skin (turkey and chicken). Examples of lean meats include pork leg, shoulder, or tenderloin, and beef round, sirloin, tenderloin, and extra lean ground beef. Other protein foods include eggs and egg substitutes, beans, peas, soy products, nuts, and seeds. Choose low-fat dairy products such as skim or 1% milk or low-fat yogurt, cheese, and cottage cheese. Limit unhealthy fats  such as butter, hard margarine, and shortening. Exercise:  Exercise at least 30 minutes per day on most days of the week. Some examples of exercise include walking, biking, dancing, and swimming.  You can also fit in more physical activity by taking the stairs instead of the elevator or parking farther away from stores. Include muscle strengthening activities 2 days each week. Regular exercise provides many health benefits. It helps you manage your weight, and decreases your risk for type 2 diabetes, heart disease, stroke, and high blood pressure. Exercise can also help improve your mood. Ask your healthcare provider about the best exercise plan for you. General health and safety guidelines:   Do not smoke. Nicotine and other chemicals in cigarettes and cigars can cause lung damage. Ask your healthcare provider for information if you currently smoke and need help to quit. E-cigarettes or smokeless tobacco still contain nicotine. Talk to your healthcare provider before you use these products. Limit alcohol. A drink of alcohol is 12 ounces of beer, 5 ounces of wine, or 1½ ounces of liquor. Lose weight, if needed. Being overweight increases your risk of certain health conditions. These include heart disease, high blood pressure, type 2 diabetes, and certain types of cancer. Protect your skin. Do not sunbathe or use tanning beds. Use sunscreen with a SPF 15 or higher. Apply sunscreen at least 15 minutes before you go outside. Reapply sunscreen every 2 hours. Wear protective clothing, hats, and sunglasses when you are outside. Drive safely. Always wear your seatbelt. Make sure everyone in your car wears a seatbelt. A seatbelt can save your life if you are in an accident. Do not use your cell phone when you are driving. This could distract you and cause an accident. Pull over if you need to make a call or send a text message. Practice safe sex. Use latex condoms if are sexually active and have more than one partner. Your healthcare provider may recommend screening tests for sexually transmitted infections (STIs). Wear helmets, lifejackets, and protective gear.   Always wear a helmet when you ride a bike or motorcycle, go skiing, or play sports that could cause a head injury. Wear protective equipment when you play sports. Wear a lifejacket when you are on a boat or doing water sports. © Copyright [employer] 2023 Information is for End User's use only and may not be sold, redistributed or otherwise used for commercial purposes. The above information is an  only. It is not intended as medical advice for individual conditions or treatments. Talk to your doctor, nurse or pharmacist before following any medical regimen to see if it is safe and effective for you.

## 2023-12-16 PROBLEM — Z01.419 ENCOUNTER FOR GYNECOLOGICAL EXAMINATION (GENERAL) (ROUTINE) WITHOUT ABNORMAL FINDINGS: Status: RESOLVED | Noted: 2023-10-17 | Resolved: 2023-12-16

## 2024-03-01 ENCOUNTER — CONSULT (OUTPATIENT)
Dept: CARDIOLOGY CLINIC | Facility: MEDICAL CENTER | Age: 34
End: 2024-03-01
Payer: COMMERCIAL

## 2024-03-01 VITALS
SYSTOLIC BLOOD PRESSURE: 104 MMHG | WEIGHT: 119 LBS | HEART RATE: 71 BPM | DIASTOLIC BLOOD PRESSURE: 70 MMHG | HEIGHT: 61 IN | BODY MASS INDEX: 22.47 KG/M2

## 2024-03-01 DIAGNOSIS — R06.09 DOE (DYSPNEA ON EXERTION): ICD-10-CM

## 2024-03-01 DIAGNOSIS — Z82.49 FAMILY HISTORY OF PREMATURE CAD: Primary | ICD-10-CM

## 2024-03-01 DIAGNOSIS — E78.5 HYPERLIPIDEMIA, UNSPECIFIED HYPERLIPIDEMIA TYPE: ICD-10-CM

## 2024-03-01 DIAGNOSIS — R00.2 PALPITATIONS: ICD-10-CM

## 2024-03-01 PROCEDURE — 93000 ELECTROCARDIOGRAM COMPLETE: CPT | Performed by: INTERNAL MEDICINE

## 2024-03-01 PROCEDURE — 99243 OFF/OP CNSLTJ NEW/EST LOW 30: CPT | Performed by: INTERNAL MEDICINE

## 2024-03-01 RX ORDER — ALBUTEROL SULFATE 90 UG/1
AEROSOL, METERED RESPIRATORY (INHALATION)
COMMUNITY
Start: 2023-11-27

## 2024-03-01 RX ORDER — HYDROXYZINE HYDROCHLORIDE 10 MG/1
10 TABLET, FILM COATED ORAL 3 TIMES DAILY PRN
COMMUNITY
Start: 2023-11-27

## 2024-03-01 NOTE — PROGRESS NOTES
Cardiology Consultation     Radha Knowles  [id number]  1990  [facility] [place]  [address] [facility] [place] [address]      Diagnoses and all orders for this visit:    Family history of premature CAD  -     Ambulatory Referral to Cardiology  -     POCT ECG  -     Stress test only, exercise; Future    Hyperlipidemia, unspecified hyperlipidemia type  -     Ambulatory Referral to Cardiology  -     POCT ECG  -     Lipid Panel with Direct LDL reflex; Future  -     Lipid Panel with Direct LDL reflex    Palpitations  -     AMB extended holter monitor; Future    PRAJAPATI (dyspnea on exertion)  -     Stress test only, exercise; Future    Other orders  -     albuterol (PROVENTIL HFA,VENTOLIN HFA) 90 mcg/act inhaler; INHALE 1-2 PUFFS EVERY 4-6 HOURS AS NEEDED FOR SHORTNESS OF BREATH  -     hydrOXYzine HCL (ATARAX) 10 mg tablet; Take 10 mg by mouth 3 (three) times a day as needed      I had the pleasure of seeing Radha Knwoles for a consultation regarding palpitations and SOB requested by , DO    History of the Presenting Illness, Discussion/Summary and my Plan are as follows:::    Radha is a pleasant 33-year-old without hypertension, diabetes, tobacco use or drug use.  Alcohol use used to be 2 2 glasses of wine a day over the weekend only but none currently none, she does have dyslipidemia based on lipid profile from 2022 and 2023 when she was on a ketogenic diet    She is mainly here for-  Palpitations and shortness of breath-palpitations occur about twice a week  Sometimes with exercise but not always her shortness of breath is with exercise, ever since she had COVID in 2023  Noticed that she was more short of breath, she did have a chest x-ray at [facility] that apparently was normal.    Despite this, she is able to exercise 45 minutes to an hour at the gym, doing both cardio and weights she does the  [facility] and walks on a treadmill at an 8.5 incline at a 3 mph speed without any cardiac symptoms.  Sometimes she feels like she cannot inhale all the way, has tried albuterol without any relief.  She wonders if this could be a manifestation of long COVID. 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  She has a sister who is 2 years younger and well but her paternal uncles had heart attack at 45 and  a fatal MI at 62, both of them were smokers and diabetics.    Diet-typically has meat-chicken with a small portion of white rice and veggies for lunch, beef and pork about 3 meals a week, does have a good amount of protein in the form of eggs, fish as well.  Does have some ghee and butter.  Minimal cream    BMI is normal, cardiac exam is normal, ECG shows normal sinus rhythm    Plan:    Palpitations: Considering the frequency of her palpitations we will check a 2-week Zio monitor.    Shortness of breath with palpitations during exercise: Check exercise treadmill stress test, suspect considering her good functional capacity and since she is able to exercise, most likely this might be normal but will rule out any arrhythmias with exercise.    Hyperlipidemia: Partly diet, genetic as well.  Dietary changes were discussed, we will simply recheck in 3 months.  Her lifetime risk is high-close to 40% of ASCVD  10-year risk will be low even if she were 40-Less than 1%    Follow-up in about 6 months         Latest Reference Range & Units 10/04/23 07:23  Ketogenic diet   Cholesterol 100 - 199 mg/dL 219 (H)   Triglycerides 0 - 149 mg/dL 104   HDL >39 mg/dL 64   LDL Calculated 0 - 99 mg/dL 137 (H)   VLDL Cholesterol Zack 5 - 40 mg/dL 18   (H): Data is abnormally high     Latest Reference Range & Units  10/04/23 07:23   BUN 6 - 20 mg/dL 21 (H)   Creatinine 0.57 - 1.00 mg/dL 0.74   (H): Data is abnormally high     Latest Reference Range & Units 01/13/22 07:07 07/14/22 14:43   Hemoglobin A1C 4.9 - 6.0 % 5.3 (E) 5.2 (E)   eAG, EST AVG Glucose  105 (E) 103 (E)   (E): External lab result     Latest Reference Range & Units 10/04/23 07:23   TSH, POC 0.450 - 4.500 uIU/mL 3.290       01/13/22  Ketogenic diet   Cholesterol -- 219 High    Triglycerides -- 46   HDL -- 64   LDL Calculated -- 145.8   Non HDL Chol. (LDL+VLDL) -- 155   Chol/HDL Ratio -- 3.42   Patient Fasting Status >= 8 Hours >= 8 Hours     1. Family history of premature CAD  Ambulatory Referral to Cardiology    POCT ECG    Stress test only, exercise      2. Hyperlipidemia, unspecified hyperlipidemia type  Ambulatory Referral to Cardiology    POCT ECG    Lipid Panel with Direct LDL reflex    Lipid Panel with Direct LDL reflex      3. Palpitations  AMB extended holter monitor      4. PRAJAPATI (dyspnea on exertion)  Stress test only, exercise        Patient Active Problem List   Diagnosis    NAHID (generalized anxiety disorder)    History of posttraumatic stress disorder (PTSD)    Palpitations    Irritable bowel syndrome (IBS)    GERD (gastroesophageal reflux disease)    Abdominal bloating    Chronic pain of both knees    Family history of premature CAD    Hyperlipidemia     Past Medical History:   Diagnosis Date    Anxiety     Gardasil complete     per pt as teen    Gastritis      Social History     Socioeconomic History    Marital status: Single     Spouse name: Not on file    Number of children: Not on file    Years of education: Not on file    Highest education level: Not on file   Occupational History    Not on file   Tobacco Use    Smoking status: Never    Smokeless tobacco: Never   Vaping Use    Vaping status: Never Used   Substance and Sexual Activity    Alcohol use: Yes     Alcohol/week: 2.0 - 4.0 standard drinks of alcohol     Types: 2 - 4 Glasses of wine per  "week     Comment: on weekend    Drug use: Never    Sexual activity: Yes     Partners: Male     Birth control/protection: Pill   Other Topics Concern    Not on file   Social History Narrative    Not on file     Social Determinants of Health     Financial Resource Strain: Not on file   Food Insecurity: Not on file   Transportation Needs: Not on file   Physical Activity: Not on file   Stress: Not on file   Social Connections: Not on file   Intimate Partner Violence: Not on file   Housing Stability: Not on file      Family History   Problem Relation Age of Onset    Mental illness Mother     Schizophrenia Mother     Hypertension Father     Hyperlipidemia Father     Diabetes Father     Heart disease Maternal Grandfather     Breast cancer Neg Hx     Uterine cancer Neg Hx     Ovarian cancer Neg Hx     Colon cancer Neg Hx     Thrombosis Neg Hx      Past Surgical History:   Procedure Laterality Date    AUGMENTATION BREAST Bilateral 2018    breast lift    LAPAROSCOPY  2023    For egg freezing    WISDOM TOOTH EXTRACTION N/A 2008       Current Outpatient Medications:     albuterol (PROVENTIL HFA,VENTOLIN HFA) 90 mcg/act inhaler, INHALE 1-2 PUFFS EVERY 4-6 HOURS AS NEEDED FOR SHORTNESS OF BREATH, Disp: , Rfl:     hydrOXYzine HCL (ATARAX) 10 mg tablet, Take 10 mg by mouth 3 (three) times a day as needed, Disp: , Rfl:     Norethin-Eth Estrad-Fe Biphas (Lo Loestrin Fe) 1 MG-10 MCG / 10 MCG TABS, Take 1 tablet by mouth in the morning , take continuously, Disp: 112 tablet, Rfl: 3  No Known Allergies  Vitals:    03/01/24 0800   BP: 104/70   Pulse: 71   Weight: 54 kg (119 lb)   Height: 5' 1\" (1.549 m)         Imaging: No results found.    Review of Systems:  Review of Systems   Constitutional: Negative.    HENT: Negative.     Eyes: Negative.    Respiratory:  Positive for shortness of breath. Negative for apnea, cough, choking, chest tightness, wheezing and stridor.    Cardiovascular:  Positive for palpitations. Negative for chest pain " "and leg swelling.   Endocrine: Negative.    Musculoskeletal: Negative.        Physical Exam:    /70   Pulse 71   Ht 5' 1\" (1.549 m)   Wt 54 kg (119 lb)   BMI 22.48 kg/m²   Physical Exam  Constitutional:       General: She is not in acute distress.     Appearance: She is not ill-appearing, toxic-appearing or diaphoretic.   HENT:      Mouth/Throat:      Mouth: Mucous membranes are moist.      Pharynx: No oropharyngeal exudate or posterior oropharyngeal erythema.   Neck:      Vascular: No carotid bruit.   Cardiovascular:      Rate and Rhythm: Normal rate and regular rhythm.      Heart sounds: No murmur heard.     No friction rub. No gallop.   Pulmonary:      Effort: Pulmonary effort is normal. No respiratory distress.      Breath sounds: No stridor. No wheezing or rhonchi.   Abdominal:      General: Abdomen is flat. There is no distension.      Palpations: There is no mass.      Tenderness: There is no abdominal tenderness.      Hernia: No hernia is present.   Musculoskeletal:      Cervical back: Normal range of motion. No rigidity or tenderness.   Lymphadenopathy:      Cervical: No cervical adenopathy.   Skin:     General: Skin is warm.      Coloration: Skin is not jaundiced or pale.      Findings: No bruising or erythema.   Neurological:      Mental Status: She is alert.            This note was completed in part utilizing Keemotion direct voice recognition software.   Grammatical errors, random word insertion, spelling mistakes, occasional wrong word or \"sound-alike\" substitutions and incomplete sentences may be an occasional consequence of the system secondary to software limitations, ambient noise and hardware issues. At the time of dictation, efforts were made to edit, clarify and /or correct errors.  Please read the chart carefully and recognize, using context, where substitutions have occurred.  If you have any questions or concerns about the context, text or information contained within the " body of this dictation, please contact myself, the provider, for further clarification.

## 2024-03-01 NOTE — PATIENT INSTRUCTIONS
"   Therapeutic lifestyle changes were discussed with the patient- Specifically:  1.  Decreasing saturated fat intake to less than 13 g per day. I showed the pt how to look at a food label.  Watch intake of cheese, red meat, cream and butter containing foods  2.  Increase soluble fiber in the diet-beans, pears, oatmeal  3.  Weight loss of even 5-10 pounds will also help to lower cholesterol levels.  Decreasing caloric intake by about 100 becky a day-should lead to a weight loss of 1-2 pounds over one month  4.  Increase aerobic physical activity - ultimate goal of 150 min per week. Start low and increase level and duration of activity slowly  5. Google 'TLC Cholesterol\" = Your guide to lowering your cholesterol with TLC is probably the best online resource to lower your LDL cholesterol  6. I showed the patient how to look at a food label.  7. Please call if you would be willing to see a dietitian    "

## 2024-07-03 ENCOUNTER — TELEPHONE (OUTPATIENT)
Age: 34
End: 2024-07-03

## 2024-07-03 ENCOUNTER — OFFICE VISIT (OUTPATIENT)
Age: 34
End: 2024-07-03
Payer: COMMERCIAL

## 2024-07-03 VITALS
HEIGHT: 61 IN | DIASTOLIC BLOOD PRESSURE: 62 MMHG | SYSTOLIC BLOOD PRESSURE: 100 MMHG | BODY MASS INDEX: 22.73 KG/M2 | WEIGHT: 120.4 LBS

## 2024-07-03 DIAGNOSIS — K21.9 GASTROESOPHAGEAL REFLUX DISEASE, UNSPECIFIED WHETHER ESOPHAGITIS PRESENT: ICD-10-CM

## 2024-07-03 DIAGNOSIS — R11.2 NAUSEA AND VOMITING, UNSPECIFIED VOMITING TYPE: Primary | ICD-10-CM

## 2024-07-03 DIAGNOSIS — R10.9 RIGHT SIDED ABDOMINAL PAIN: ICD-10-CM

## 2024-07-03 PROCEDURE — 99203 OFFICE O/P NEW LOW 30 MIN: CPT | Performed by: INTERNAL MEDICINE

## 2024-07-03 RX ORDER — BIMATOPROST 3 UG/ML
SOLUTION TOPICAL
COMMUNITY
Start: 2024-05-09

## 2024-07-03 RX ORDER — SUCRALFATE 1 G/1
1 TABLET ORAL 4 TIMES DAILY
COMMUNITY
Start: 2024-05-04

## 2024-07-03 RX ORDER — OMEPRAZOLE 20 MG/1
20 CAPSULE, DELAYED RELEASE ORAL DAILY
COMMUNITY

## 2024-07-03 NOTE — TELEPHONE ENCOUNTER
Scheduled date of EGD(as of today): 7/26/2024  Physician performing EGD:   Location of EGD: [facility]  Instructions reviewed with patient by: BILL  Clearances:  N

## 2024-07-03 NOTE — PROGRESS NOTES
[facility] - Outpatient Consultation  Radha Knowles 33 y.o. female MRN: [id number]  Encounter: [id number]    ASSESSMENT AND PLAN:    1. Nausea and vomiting, unspecified vomiting type  -     EGD; Future; Expected date: 07/03/2024  2. Right sided abdominal pain  -     EGD; Future; Expected date: 07/03/2024  -      right upper quadrant; Future; Expected date: 07/03/2024  3. Gastroesophageal reflux disease, unspecified whether esophagitis present    Assessment & Plan  1. Gastroesophageal reflux disease.  Right-sided abdominal pain nausea vomiting.  The patient was advised to avoid triggers, abstain from eating 2 to 3 hours prior to bedtime, elevate her head of her bed by 15 degrees, and use a wedge pillow. Regular exercise was recommended to help reduce reflux events.     Alginates and H2 blockers were recommended as an alternative. We may need to continue PPI if with esophagitis on EGD.  Will need to try and combat SIBO while on PPI.  An endoscopy was ordered to check for H. pylori and for alarm symptom of vomiting.  This will be off PPI for 2 weeks.  The patient was advised to incorporate fermented foods into her diet, such as johnny cheese and sauerkraut, and to consider probiotics. An ultrasound of the right upper quadrant was ordered.  If EGD is normal consider workup with impedance testing.    ---    Chief Complaint   Patient presents with    Nausea     Pt is experiencing indigestion, belching, occasional vomiting and nausea. Omeprazole helps somewhat with symptoms. Developed SIBO in the past while on omeprazole. Also has pain on R side radiating into back. Would like to discuss EGD.        HPI:   Radha Knowles is a 33 y.o. female presenting to establish care.   History of Present Illness  The patient presents for evaluation of indigestion and nausea.    The patient reports experiencing severe indigestion and nausea, which occasionally necessitates prolonged food intake  and subsequent weight loss, leading to difficulty in maintaining caloric intake. She also experiences constant belching and burping, even during water intake, and reports a sensation of irritation in her stomach. Despite low anxiety levels, her symptoms persist. If she consumes large amounts of water on an empty stomach, she experiences stomach acid, leading to vomiting. Recently, she has been experiencing pain under her right rib cage that radiates to her back, which she correlates with her symptoms. She initiated a PPI approximately a month ago, which significantly alleviated her symptoms. She has a history of SIBO symptoms, which were successfully treated with Xifaxan. However, her symptoms recurred after a slow taper of Xifaxan over a period of 8 months. She suspects that a bland diet would be beneficial, but it is challenging to adhere to long-term. Her last endoscopy revealed redness from the esophagus to the stomach. She was advised to double her PPI dosage, but she began experiencing gas symptoms, leading her to reduce the dosage. Her symptoms occur daily, but she denies any dysphagia or food impaction. She denies any lower GI symptoms, changes in bowel movements, or hematochezia. She has a history of anxiety, which prevents her from treating with SSRIs due to stomach irritation. She tried Prozac, which resulted in a severe GI flare-up 3 to 4 weeks after the initiation of SSRIs. She denies any abdominal surgeries and does not take NSAIDs on a regular basis. She underwent an MRI of her abdomen in 2019, which did not reveal any abnormalities. She is currently taking omeprazole 20 mg.   She denies any family history of GI cancers or disorders.    Historical Information   Past Medical History:   Diagnosis Date    Anxiety     Gardasil complete     per pt as teen    Gastritis     GERD (gastroesophageal reflux disease)     Irritable bowel syndrome     Lactose intolerance      Past Surgical History:   Procedure  "Laterality Date    AUGMENTATION BREAST Bilateral 2018    breast lift    LAPAROSCOPY  2023    For egg freezing    WISDOM TOOTH EXTRACTION N/A 2008     Social History     Substance and Sexual Activity   Alcohol Use Yes    Alcohol/week: 2.0 - 4.0 standard drinks of alcohol    Types: 2 - 4 Glasses of wine per week    Comment: on weekend     Social History     Substance and Sexual Activity   Drug Use Never     Social History     Tobacco Use   Smoking Status Never    Passive exposure: Never   Smokeless Tobacco Never     Family History   Problem Relation Age of Onset    Mental illness Mother     Schizophrenia Mother     Hypertension Father     Hyperlipidemia Father     Diabetes Father     Heart disease Maternal Grandfather     Breast cancer Neg Hx     Uterine cancer Neg Hx     Ovarian cancer Neg Hx     Colon cancer Neg Hx     Thrombosis Neg Hx        Meds/Allergies     Current Outpatient Medications:     bimatoprost (LATISSE) 0.03 % ophthalmic solution    hydrOXYzine HCL (ATARAX) 10 mg tablet    Norethin-Eth Estrad-Fe Biphas (Lo Loestrin Fe) 1 MG-10 MCG / 10 MCG TABS    omeprazole (PriLOSEC) 20 mg delayed release capsule    sucralfate (CARAFATE) 1 g tablet    albuterol (PROVENTIL HFA,VENTOLIN HFA) 90 mcg/act inhaler  No Known Allergies    PHYSICAL EXAM:    Blood pressure 100/62, height 5' 1\" (1.549 m), weight 54.6 kg (120 lb 6.4 oz). Body mass index is 22.75 kg/m².  Physical Exam      General Appearance: No apparent distress, cooperative, alert.  Eyes: Anicteric.  Gastrointestinal: Soft, non-tender, non-distended; normal bowel sounds; no masses, no organomegaly.    Rectal: Deferred.  Musculoskeletal: No edema.  Skin: No jaundice.     OTHER LAB RESULTS:   Lab Results   Component Value Date    WBC 5.8 10/04/2023    HGB 12.4 10/04/2023    MCV 92 10/04/2023     10/04/2023     Lab Results   Component Value Date    K 4.2 10/04/2023     10/04/2023    CO2 22 10/04/2023    BUN 21 (H) 10/04/2023    CREATININE 0.74 " "10/04/2023    CALCIUM 9.2 01/13/2022    AST 14 10/04/2023    AST 15 01/13/2022    ALT 11 10/04/2023    ALT 16 01/13/2022    ALKPHOS 31 (L) 01/13/2022    EGFR 109 10/04/2023     No results found for: \"IRON\", \"TIBC\", \"FERRITIN\"  No results found for: \"LIPASE\"    OTHER RADIOLOGY RESULTS:   No results found.  "

## 2024-07-03 NOTE — TELEPHONE ENCOUNTER
Scheduled date of EGD(as of today): 8/14/24    Physician performing EGD: Dr. Reyez    Location of EGD: [facility]

## 2024-07-16 ENCOUNTER — HOSPITAL ENCOUNTER (OUTPATIENT)
Dept: ULTRASOUND IMAGING | Facility: MEDICAL CENTER | Age: 34
Discharge: HOME | End: 2024-07-16
Payer: COMMERCIAL

## 2024-07-16 DIAGNOSIS — R10.9 RIGHT SIDED ABDOMINAL PAIN: ICD-10-CM

## 2024-07-16 PROCEDURE — 76705 ECHO EXAM OF ABDOMEN: CPT

## 2024-08-09 DIAGNOSIS — K21.9 GASTROESOPHAGEAL REFLUX DISEASE, UNSPECIFIED WHETHER ESOPHAGITIS PRESENT: Primary | ICD-10-CM

## 2024-08-09 RX ORDER — RABEPRAZOLE SODIUM 20 MG/1
20 TABLET, DELAYED RELEASE ORAL 2 TIMES DAILY
Qty: 60 TABLET | Refills: 5 | Status: SHIPPED | OUTPATIENT
Start: 2024-08-09 | End: 2024-08-12

## 2024-08-13 ENCOUNTER — TELEPHONE (OUTPATIENT)
Age: 34
End: 2024-08-13

## 2024-08-13 NOTE — TELEPHONE ENCOUNTER
Scheduled date of EGD(as of today): 8/28/2024  Physician performing EGD: DR COOPER  Location of EGD: [facility]  Instructions reviewed with patient by: Previously reviewed with pt. Verified pt has all procedure directions.  Clearances: n/a

## 2024-08-23 ENCOUNTER — TELEPHONE (OUTPATIENT)
Age: 34
End: 2024-08-23

## 2024-08-23 DIAGNOSIS — M25.562 BILATERAL CHRONIC KNEE PAIN: Primary | ICD-10-CM

## 2024-08-23 DIAGNOSIS — G89.29 BILATERAL CHRONIC KNEE PAIN: Primary | ICD-10-CM

## 2024-08-23 DIAGNOSIS — M25.511 CHRONIC RIGHT SHOULDER PAIN: Primary | ICD-10-CM

## 2024-08-23 DIAGNOSIS — M25.561 BILATERAL CHRONIC KNEE PAIN: Primary | ICD-10-CM

## 2024-08-23 DIAGNOSIS — G89.29 CHRONIC RIGHT SHOULDER PAIN: Primary | ICD-10-CM

## 2024-08-23 NOTE — TELEPHONE ENCOUNTER
Left detailed message that the referrals for [facility] are done and will be faxed over to that place

## 2024-08-23 NOTE — TELEPHONE ENCOUNTER
Pt called to request new order for Physical Therapy for chronic bi-lateral knee pain and a separate order for Physical Therapy for chronic right shoulder pain. [facility],  please fax# [phone] thank you.

## 2024-08-23 NOTE — TELEPHONE ENCOUNTER
Pt called; she received a message stating she needs to schedule an appt. I did not see any messages from office or PCP regarding this and advised pt. I confirmed with pt that referrals will be faxed to [facility].

## 2024-08-28 ENCOUNTER — HOSPITAL ENCOUNTER (OUTPATIENT)
Dept: GASTROENTEROLOGY | Facility: AMBULATORY SURGERY CENTER | Age: 34
Discharge: HOME | End: 2024-08-28
Attending: INTERNAL MEDICINE
Payer: COMMERCIAL

## 2024-08-28 ENCOUNTER — ANESTHESIA EVENT (OUTPATIENT)
Dept: GASTROENTEROLOGY | Facility: AMBULATORY SURGERY CENTER | Age: 34
End: 2024-08-28

## 2024-08-28 ENCOUNTER — ANESTHESIA (OUTPATIENT)
Dept: GASTROENTEROLOGY | Facility: AMBULATORY SURGERY CENTER | Age: 34
End: 2024-08-28

## 2024-08-28 VITALS
WEIGHT: 120 LBS | HEIGHT: 61 IN | DIASTOLIC BLOOD PRESSURE: 59 MMHG | OXYGEN SATURATION: 100 % | BODY MASS INDEX: 22.66 KG/M2 | SYSTOLIC BLOOD PRESSURE: 102 MMHG | RESPIRATION RATE: 18 BRPM | TEMPERATURE: 98.3 F | HEART RATE: 61 BPM

## 2024-08-28 DIAGNOSIS — R11.2 NAUSEA AND VOMITING, UNSPECIFIED VOMITING TYPE: ICD-10-CM

## 2024-08-28 DIAGNOSIS — R10.9 RIGHT SIDED ABDOMINAL PAIN: ICD-10-CM

## 2024-08-28 LAB
EXT PREGNANCY TEST URINE: NEGATIVE
EXT. CONTROL: NORMAL

## 2024-08-28 PROCEDURE — 43239 EGD BIOPSY SINGLE/MULTIPLE: CPT | Performed by: INTERNAL MEDICINE

## 2024-08-28 PROCEDURE — 88305 TISSUE EXAM BY PATHOLOGIST: CPT | Performed by: PATHOLOGY

## 2024-08-28 PROCEDURE — 88341 IMHCHEM/IMCYTCHM EA ADD ANTB: CPT | Performed by: PATHOLOGY

## 2024-08-28 PROCEDURE — 88342 IMHCHEM/IMCYTCHM 1ST ANTB: CPT | Performed by: PATHOLOGY

## 2024-08-28 RX ORDER — PROPOFOL 10 MG/ML
INJECTION, EMULSION INTRAVENOUS AS NEEDED
Status: DISCONTINUED | OUTPATIENT
Start: 2024-08-28 | End: 2024-08-28

## 2024-08-28 RX ORDER — RABEPRAZOLE SODIUM 20 MG/1
20 TABLET, DELAYED RELEASE ORAL 2 TIMES DAILY
COMMUNITY

## 2024-08-28 RX ORDER — SODIUM CHLORIDE, SODIUM LACTATE, POTASSIUM CHLORIDE, CALCIUM CHLORIDE 600; 310; 30; 20 MG/100ML; MG/100ML; MG/100ML; MG/100ML
INJECTION, SOLUTION INTRAVENOUS CONTINUOUS PRN
Status: DISCONTINUED | OUTPATIENT
Start: 2024-08-28 | End: 2024-08-28

## 2024-08-28 RX ORDER — SODIUM CHLORIDE, SODIUM LACTATE, POTASSIUM CHLORIDE, CALCIUM CHLORIDE 600; 310; 30; 20 MG/100ML; MG/100ML; MG/100ML; MG/100ML
50 INJECTION, SOLUTION INTRAVENOUS CONTINUOUS
Status: DISCONTINUED | OUTPATIENT
Start: 2024-08-28 | End: 2024-09-01 | Stop reason: HOSPADM

## 2024-08-28 RX ORDER — LIDOCAINE HYDROCHLORIDE 10 MG/ML
INJECTION, SOLUTION EPIDURAL; INFILTRATION; INTRACAUDAL; PERINEURAL AS NEEDED
Status: DISCONTINUED | OUTPATIENT
Start: 2024-08-28 | End: 2024-08-28

## 2024-08-28 RX ADMIN — SODIUM CHLORIDE, SODIUM LACTATE, POTASSIUM CHLORIDE, CALCIUM CHLORIDE 50 ML/HR: 600; 310; 30; 20 INJECTION, SOLUTION INTRAVENOUS at 11:13

## 2024-08-28 RX ADMIN — PROPOFOL 50 MG: 10 INJECTION, EMULSION INTRAVENOUS at 11:19

## 2024-08-28 RX ADMIN — SODIUM CHLORIDE, SODIUM LACTATE, POTASSIUM CHLORIDE, CALCIUM CHLORIDE: 600; 310; 30; 20 INJECTION, SOLUTION INTRAVENOUS at 11:05

## 2024-08-28 RX ADMIN — LIDOCAINE HYDROCHLORIDE 100 MG: 10 INJECTION, SOLUTION EPIDURAL; INFILTRATION; INTRACAUDAL; PERINEURAL at 11:19

## 2024-08-28 RX ADMIN — PROPOFOL 70 MG: 10 INJECTION, EMULSION INTRAVENOUS at 11:20

## 2024-08-28 NOTE — ANESTHESIA POSTPROCEDURE EVALUATION
Post-Op Assessment Note    CV Status:  Stable  Pain Score: 0    Pain management: adequate       Mental Status:  Alert and awake   Hydration Status:  Euvolemic   PONV Controlled:  Controlled   Airway Patency:  Patent     Post Op Vitals Reviewed: Yes    No anethesia notable event occurred.    Staff: CRNA           BP   107/61   Temp      Pulse 67   Resp 14   SpO2 97%ra

## 2024-08-28 NOTE — H&P
"History and Physical -  [facility]  Radha Knowles 33 y.o. female MRN: [id number]    HPI: Radha Knowles is a 33 y.o. female who presents for EGD for reflux    REVIEW OF SYSTEMS: Per the HPI, and otherwise unremarkable.    Historical Information   Past Medical History:   Diagnosis Date    Anxiety     Gardasil complete     per pt as teen    Gastritis     GERD (gastroesophageal reflux disease)     Hyperlipidemia     Irritable bowel syndrome     Lactose intolerance      Past Surgical History:   Procedure Laterality Date    AUGMENTATION BREAST Bilateral 2018    breast lift    EGD  2021    LAPAROSCOPY  2023    For egg freezing    WISDOM TOOTH EXTRACTION N/A 2008     Social History   Social History     Substance and Sexual Activity   Alcohol Use Not Currently     Social History     Substance and Sexual Activity   Drug Use Never     Social History     Tobacco Use   Smoking Status Never    Passive exposure: Never   Smokeless Tobacco Never     Family History   Problem Relation Age of Onset    Mental illness Mother     Schizophrenia Mother     Hypertension Father     Hyperlipidemia Father     Diabetes Father     Heart disease Maternal Grandfather     Breast cancer Neg Hx     Uterine cancer Neg Hx     Ovarian cancer Neg Hx     Colon cancer Neg Hx     Thrombosis Neg Hx        Meds/Allergies       Current Outpatient Medications:     bimatoprost (LATISSE) 0.03 % ophthalmic solution    hydrOXYzine HCL (ATARAX) 10 mg tablet    RABEprazole (ACIPHEX) 20 MG tablet    sucralfate (CARAFATE) 1 g tablet    albuterol (PROVENTIL HFA,VENTOLIN HFA) 90 mcg/act inhaler    esomeprazole (NexIUM) 40 MG capsule    Norethin-Eth Estrad-Fe Biphas (Lo Loestrin Fe) 1 MG-10 MCG / 10 MCG TABS    Current Facility-Administered Medications:     lactated ringers infusion, 50 mL/hr, Intravenous, Continuous    No Known Allergies    Objective     BP 97/68   Pulse 56   Temp 98.3 °F (36.8 °C) (Temporal)   Resp 18   Ht 5' 1\" (1.549 m)   " Wt 54.4 kg (120 lb)   LMP 08/19/2024 (Approximate) Comment: Pregnancy test negative  SpO2 100%   BMI 22.67 kg/m²     PHYSICAL EXAM    General Appearance: NAD, cooperative, alert  Eyes: Anicteric  GI:  Soft, non-tender, non-distended; normal bowel sounds; no masses, no organomegaly   Rectal: Deferred until procedure  Musculoskeletal: No edema.  Skin:  No jaundice    ASSESSMENT/PLAN:  This is a 33 y.o. year old female here for EGD, and she is stable and optimized for her procedure.

## 2024-08-28 NOTE — ANESTHESIA PREPROCEDURE EVALUATION
Procedure:  EGD    Relevant Problems   ANESTHESIA (within normal limits)      CARDIO   (+) Hyperlipidemia      ENDO (within normal limits)      GI/HEPATIC   (+) GERD (gastroesophageal reflux disease)      /RENAL (within normal limits)      GYN (within normal limits)      HEMATOLOGY (within normal limits)      MUSCULOSKELETAL (within normal limits)      NEURO/PSYCH   (+) NAHID (generalized anxiety disorder)      PULMONARY (within normal limits)        Physical Exam    Airway    Mallampati score: II         Dental       Cardiovascular  Cardiovascular exam normal    Pulmonary  Pulmonary exam normal     Other Findings  post-pubertal.      Anesthesia Plan  ASA Score- 2     Anesthesia Type- IV sedation with anesthesia with ASA Monitors.         Additional Monitors:     Airway Plan:            Plan Factors-Exercise tolerance (METS): >4 METS.    Chart reviewed.    Patient summary reviewed.    Patient is not a current smoker. Patient not instructed to abstain from smoking on day of procedure. Patient did not smoke on day of surgery.            Induction-     Postoperative Plan-         Informed Consent- Anesthetic plan and risks discussed with patient.  I personally reviewed this patient with the CRNA. Discussed and agreed on the Anesthesia Plan with the CRNA..

## 2024-09-04 PROCEDURE — 88342 IMHCHEM/IMCYTCHM 1ST ANTB: CPT | Performed by: PATHOLOGY

## 2024-09-04 PROCEDURE — 88305 TISSUE EXAM BY PATHOLOGIST: CPT | Performed by: PATHOLOGY

## 2024-09-04 PROCEDURE — 88341 IMHCHEM/IMCYTCHM EA ADD ANTB: CPT | Performed by: PATHOLOGY

## 2025-03-28 DIAGNOSIS — K21.9 GASTROESOPHAGEAL REFLUX DISEASE, UNSPECIFIED WHETHER ESOPHAGITIS PRESENT: Primary | ICD-10-CM

## 2025-03-28 RX ORDER — RABEPRAZOLE SODIUM 20 MG/1
20 TABLET, DELAYED RELEASE ORAL 2 TIMES DAILY
Qty: 60 TABLET | Refills: 3 | Status: SHIPPED | OUTPATIENT
Start: 2025-03-28

## 2025-04-30 ENCOUNTER — TELEPHONE (OUTPATIENT)
Age: 35
End: 2025-04-30

## 2025-04-30 NOTE — TELEPHONE ENCOUNTER
Pt called for PT referral for both ankles, pt lives 1 hour away. I did inform pt that she will need to be seen for referral. Pt will find a PCP closer to her new residence